# Patient Record
Sex: FEMALE | Race: BLACK OR AFRICAN AMERICAN
[De-identification: names, ages, dates, MRNs, and addresses within clinical notes are randomized per-mention and may not be internally consistent; named-entity substitution may affect disease eponyms.]

---

## 2017-03-10 NOTE — ER DOCUMENT REPORT
ED General





- General


Chief Complaint: Chest Congestion


Stated Complaint: CHEST DISCOMFORT


Mode of Arrival: Wheelchair


Information source: Patient


Notes: 


26-year-old female presents with multiple complaints.  Patient notes that she 

is having some difficulty hearing complaining of gastric reflux complaining of 

left shoulder pain left anterior chest wall pain.  Patient notes she fell about 

2 weeks ago and since then she's been feeling achy.  Patient has had gastric 

reflux her life.  Denies any fevers or chills nausea.  Patient admits to 

intermittent decreased appetite





- HPI


Onset: Other


Onset/Duration: Persistent


Quality of pain: Achy


Severity: Mild


Pain Level: 1


Associated symptoms: Body/muscle aches, Earache, Nausea, Vomiting


Exacerbated by: Movement


Relieved by: Denies


Similar symptoms previously: No


Recently seen / treated by doctor: No





Past Medical History





- General


Information source: Patient, Relative - 





- Social History


Smoking Status: Never Smoker


Cigarette use (# per day): No


Chew tobacco use (# tins/day): No


Smoking Education Provided: No


Family History: Reviewed & Not Pertinent





Review of Systems





- Review of Systems


Notes: 


PHYSICAL EXAMINATION:





GENERAL: Well-appearing, well-nourished and in no acute distress.





HEAD: Atraumatic, normocephalic.





EYES: Pupils equal round and reactive to light, extraocular movements intact, 

conjunctiva are normal.





ENT: Nares patent, oropharynx clear without exudates.  Moist mucous membranes.





NECK: Normal range of motion, supple without lymphadenopathy





LUNGS: Breath sounds clear to auscultation bilaterally and equal.  No wheezes 

rales or rhonchi.





HEART: Regular rate and rhythm without murmurs reproducible chest wall 

tenderness





ABDOMEN: Soft, nontender, nondistended abdomen.  No guarding, no rebound.  No 

masses appreciated.





Female : deferred





Musculoskeletal: Normal range of motion, no pitting or edema.  No cyanosis.





NEUROLOGICAL: Cranial nerves grossly intact.  Normal speech, normal gait.  

Normal sensory, motor exams





PSYCH: Normal mood, normal affect.





SKIN: Warm, Dry, normal turgor, no rashes or lesions noted.





Physical Exam





- Vital signs


Vitals: 





 











Temp Pulse Resp BP Pulse Ox


 


 98.5 F   78   16   117/71   99 


 


 03/10/17 16:47  03/10/17 16:47  03/10/17 16:47  03/10/17 16:47  03/10/17 16:47














Course





- Re-evaluation


Re-evalutation: 





03/10/17 19:57


Physical examination notes no significant abnormality lab work x-ray was 

negative.  Patient does not have cardiac related chest pain.











Patient will be treated for her gastric reflux and otherwise stable for 

discharge











After performing a Medical Screening Examination, I estimate there is LOW risk 

for RUPTURED ESOPHAGUS, PNEUMOTHORAX, PULMONARY EMBOLISM, ACUTE CORONARY 

SYNDROME, OR THORACIC AORTIC DISSECTION, thus I consider the discharge 

disposition reasonable. The patient and I have discussed the diagnosis and risks

, and we agree with discharging home with close follow-up. We also discussed 

returning to the Emergency Department immediately if new or worsening symptoms 

occur. We have discussed the symptoms which are most concerning (e.g., bloody 

sputum, worsening pain or shortness of breath) that necessitate immediate 

return.





- Vital Signs


Vital signs: 





 











Temp Pulse Resp BP Pulse Ox


 


 98.5 F   78   16   117/71   99 


 


 03/10/17 16:47  03/10/17 16:47  03/10/17 16:47  03/10/17 16:47  03/10/17 16:47














- Laboratory


Result Diagrams: 


 03/10/17 18:35





 03/10/17 18:35


Laboratory results interpreted by me: 





 











  03/10/17 03/10/17 03/10/17





  18:35 18:35 18:35


 


RDW  14.4 H  


 


Eosinophils %  8.0 H  


 


Absolute Eosinophils  0.7 H  


 


Total Protein   10.6 H 


 


Urine Ketones    TRACE H


 


Ur Leukocyte Esterase    MODERATE H














- Diagnostic Test


Radiology reviewed: Image reviewed, Reports reviewed





- EKG Interpretation by Me


EKG shows normal: Sinus rhythm, Axis, Intervals, QRS Complexes





Discharge





- Discharge


Clinical Impression: 


 Chest wall pain, Gastric reflux





Ear pressure


Qualifiers:


 Laterality: bilateral Qualified Code(s): H93.8X3 - Other specified disorders 

of ear, bilateral





Condition: Stable


Disposition: HOME, SELF-CARE


Instructions:  Chest Wall Pain ([facility])


Prescriptions: 


Famotidine [Pepcid 20 mg Tablet] 20 mg PO DAILY #60 tablet


Naproxen 500 mg PO BID #30 tablet


Referrals: 


TINY WASHINGTON MD [ACTIVE STAFF] - Follow up tomorrow

## 2017-03-10 NOTE — ER DOCUMENT REPORT
ED Medical Screen (RME)





- General


Stated Complaint: CHEST DISCOMFORT


Mode of Arrival: Wheelchair


Information source: Patient, Relative - 


Notes: 


Patient presents to the emergency department with complaints of chest heaviness 

and left arm pain.  Patient reports she fell down steps 2 weeks ago and had 

bruising to her chest.  She was evaluated at a hospital and discharged home.  

She reports now her chest feels heavy.  She reports she has a headache very 

weak no appetite.  She reports a lot of heartburn.  Denies any past medical 

history.








I have greeted and performed a rapid initial assessment of this patient.  A 

comprehensive ED assessment and evaluation of the patient, analysis of test 

results and completion of the medical decision making process will be conducted 

by additional ED providers.





Physical Exam





- Vital signs


Vitals: 





 











Temp Pulse Resp BP Pulse Ox


 


 98.5 F   78   16   117/71   99 


 


 03/10/17 16:47  03/10/17 16:47  03/10/17 16:47  03/10/17 16:47  03/10/17 16:47














Course





- Vital Signs


Vital signs: 





 











Temp Pulse Resp BP Pulse Ox


 


 98.5 F   78   16   117/71   99 


 


 03/10/17 16:47  03/10/17 16:47  03/10/17 16:47  03/10/17 16:47  03/10/17 16:47

## 2019-11-18 ENCOUNTER — HOSPITAL ENCOUNTER (EMERGENCY)
Dept: HOSPITAL 62 - ER | Age: 29
LOS: 1 days | Discharge: HOME | End: 2019-11-19
Payer: SELF-PAY

## 2019-11-18 DIAGNOSIS — M87.851: Primary | ICD-10-CM

## 2019-11-18 DIAGNOSIS — R26.2: ICD-10-CM

## 2019-11-18 DIAGNOSIS — L20.9: ICD-10-CM

## 2019-11-18 DIAGNOSIS — M87.852: ICD-10-CM

## 2019-11-18 DIAGNOSIS — M79.604: ICD-10-CM

## 2019-11-18 DIAGNOSIS — F17.200: ICD-10-CM

## 2019-11-18 PROCEDURE — 36415 COLL VENOUS BLD VENIPUNCTURE: CPT

## 2019-11-18 PROCEDURE — 85045 AUTOMATED RETICULOCYTE COUNT: CPT

## 2019-11-18 PROCEDURE — 96374 THER/PROPH/DIAG INJ IV PUSH: CPT

## 2019-11-18 PROCEDURE — 99284 EMERGENCY DEPT VISIT MOD MDM: CPT

## 2019-11-18 PROCEDURE — 96361 HYDRATE IV INFUSION ADD-ON: CPT

## 2019-11-18 PROCEDURE — 80053 COMPREHEN METABOLIC PANEL: CPT

## 2019-11-18 PROCEDURE — 73564 X-RAY EXAM KNEE 4 OR MORE: CPT

## 2019-11-18 PROCEDURE — 96375 TX/PRO/DX INJ NEW DRUG ADDON: CPT

## 2019-11-18 PROCEDURE — 71045 X-RAY EXAM CHEST 1 VIEW: CPT

## 2019-11-18 PROCEDURE — 85025 COMPLETE CBC W/AUTO DIFF WBC: CPT

## 2019-11-18 PROCEDURE — 84703 CHORIONIC GONADOTROPIN ASSAY: CPT

## 2019-11-18 PROCEDURE — 73610 X-RAY EXAM OF ANKLE: CPT

## 2019-11-18 PROCEDURE — 73502 X-RAY EXAM HIP UNI 2-3 VIEWS: CPT

## 2019-11-18 PROCEDURE — 93971 EXTREMITY STUDY: CPT

## 2019-11-18 NOTE — RADIOLOGY REPORT (SQ)
EXAM DESCRIPTION: 



US EXTREMITY VEINS RIGHT



COMPLETED DATE/TME:  11/18/2019 18:49



CLINICAL HISTORY: 



29 years, Female, RLE pain



COMPARISON:

None.



TECHNIQUE:





LIMITATIONS:

None.



FINDINGS:



There are some prominent lymph nodes in the right groin.



The common femoral, femoral, popliteal, posterior tibial and

peroneal veins are patent and compressible.



Venous Doppler waveforms are unremarkable.



IMPRESSION:



Prominent lymph nodes in the right groin.



No evidence of deep venous thrombosis. 

 



copyright 2011 [employer]- All Rights Reserved

## 2019-11-18 NOTE — ER DOCUMENT REPORT
ED General





- General


Chief Complaint: Leg Pain


Stated Complaint: LEG PAIN


Time Seen by Provider: 11/18/19 18:40


Primary Care Provider: 


MIGUEL BONDS MD [PEDIATRICS] - Follow up as needed





- HPI


Notes: 





Patient is a 29-year-old female who presents emergency department for evaluation

 of right leg pain.  She states is been ongoing for about 3 weeks.  She states 

is progressive.  She is actually to the point where she can barely walk.  She 

denies any known injury.  She states she started to have pain in her left leg 

because she is been compensating so much for the pain in her right.  She denies 

any chest pain or shortness of breath.  She states she really cannot describe 

the pain other than its excruciating.  She does relate another type of pain in 

her right shin, that she described as a burning and scraping tight sensation.  

It is worsened by movement, nothing seems to make it better.





- Related Data


Allergies/Adverse Reactions: 


                                        





No Known Allergies Allergy (Unverified 03/10/17 19:53)


   








Home Medications: Dupilumab weekly





Past Medical History





- General


Information source: Patient





- Social History


Smoking Status: Current Every Day Smoker


Frequency of alcohol use: Occasional


Drug Abuse: None


Family History: Reviewed & Not Pertinent


Patient has suicidal ideation: No


Patient has homicidal ideation: No


Skin Medical History: Reports Other - Atopic dermatitis





- Immunizations


Hx Diphtheria, Pertussis, Tetanus Vaccination: Yes





Review of Systems





- Review of Systems


Constitutional: No symptoms reported


EENT: No symptoms reported


Cardiovascular: No symptoms reported


Respiratory: No symptoms reported


Gastrointestinal: No symptoms reported


Genitourinary: No symptoms reported


Musculoskeletal: See HPI


Skin: No symptoms reported


Neurological/Psychological: No symptoms reported





Physical Exam





- Vital signs


Vitals: 


                                        











Temp Pulse BP Pulse Ox


 


 98.7 F   76   123/88 H  100 


 


 11/18/19 18:21  11/18/19 18:21  11/18/19 18:21  11/18/19 18:21














- Notes


Notes: 





Vital signs reviewed, please refer to chart. Head is normocephalic, atraumatic. 

 Pupils equal round, reactive to light.  Neck is supple without meningismus.  

Heart is regular rate and rhythm.  Lungs are clear to auscultation bilaterally. 

 Abdomen is soft, nontender, normoactive bowel sounds throughout.  Extremities 

without cyanosis, clubbing.  Examination of the right lower extremity yields no 

obvious deformity.  She has marked tenderness palpation over the greater 

trochanter.  Passive range of motion of the hip elicits some pain.  She has 

tenderness to palpation over the anterior thigh and lateral knee as well.  Full 

range of motion of the knee, ankle, toes.  Neurovascularly intact distally.  

There is no apparent tenderness on palpation of the left lower extremity, or 

pain elicited with range of motion.





Course





- Re-evaluation


Re-evalutation: 





11/19/19 01:18


Patient presents emergency department for evaluation of right leg pain.  She had

 imaging is ordered through triage.  Imaging revealed findings concerning for 

avascular necrosis of the hips.  Patient is unable to tell me anything about her

 family history, she is from [place].  I was concerned about the possibility of 

sickle cell anemia being the etiology of this.  Blood work was ordered, in 

addition to IV fluids and pain medication.  Blood work failed to reveal any 

signs of sickle cell.  On further questioning the patient admits to extensive 

prednisone therapy in the past for her atopic dermatitis.  This is likely the 

cause of her AVN.  I will refer her on to Ortho.  She will be sent home with 

prescription strength anti-inflammatories and pain medication.  She is to return

 to the ED with worsening.





- Vital Signs


Vital signs: 


                                        











Temp Pulse Resp BP Pulse Ox


 


 98.7 F   76      123/88 H  100 


 


 11/18/19 18:21  11/18/19 18:21     11/18/19 18:21  11/18/19 18:21














- Laboratory


Result Diagrams: 


                                 11/18/19 23:45





                                 11/18/19 23:45


Laboratory results interpreted by me: 


                                        











  11/18/19 11/18/19





  23:45 23:45


 


RDW  14.2 H 


 


Eos % (Auto)  8.8 H 


 


Chloride   109 H


 


Total Protein   10.5 H














- Diagnostic Test


Radiology reviewed: Reports reviewed


Radiology results interpreted by me: 





11/19/19 01:19





                                        





Ankle X-Ray  11/18/19 18:49


IMPRESSION:  NO FRACTURE.


 








Hip/Pelvis X-Ray  11/18/19 18:49


IMPRESSION:  No fracture or dislocation.  Lytic-sclerotic areas in both femoral 

heads, possible AVN.


 








Knee X-Ray  11/18/19 18:49


IMPRESSION:  NO FRACTURE.


 








Venous Doppler Study  11/18/19 18:49


IMPRESSION:


 


Prominent lymph nodes in the right groin.


 


No evidence of deep venous thrombosis. 


 


 


copyright 2011 [employer]- All Rights Reserved


 








Chest X-Ray  11/18/19 18:50


IMPRESSION:  Slightly increased interstitial- alveolar opacities, nonspecific.  

No consolidation or pleural effusion.


 














Discharge





- Discharge


Clinical Impression: 


 Avascular necrosis of right femoral head, Right leg pain, Avascular necrosis of

 left femoral head





Condition: Stable


Disposition: HOME, SELF-CARE


Instructions:  Leg Pain Nonspecific ([facility])


Additional Instructions: 


Findings on your x-ray were concerning for a condition called avascular necrosis

 of both of your hips.  This needs to be followed up.  You have been referred on

 to orthopedic surgery, as well as to [facility] for [facility] 

[facility].  Take medications as directed for pain.  Watch for dizziness, drowsiness, 

constipation with the pain medication.  Return to the emergency department if 

you develop worsening or new concerning symptoms of any sort.


Prescriptions: 


Naproxen [Naprosyn] 500 mg PO BID #20 tablet


Forms:  Return to School


Referrals: 


POPPY CARMICHAEL DO [ACTIVE STAFF] - Follow up as needed


[facility] [NO LOCAL MD] - Follow up as needed

## 2019-11-18 NOTE — RADIOLOGY REPORT (SQ)
EXAM DESCRIPTION:  CHEST SINGLE VIEW



COMPLETED DATE/TIME:  11/18/2019 7:19 pm



REASON FOR STUDY:  shortness of breath



COMPARISON:  None.



TECHNIQUE:  Single frontal radiographic view of the chest acquired.



NUMBER OF VIEWS:  One view.



LIMITATIONS:  None.



FINDINGS:  LUNGS AND PLEURA: No pneumothorax.  Slightly increased interstitial- alveolar opacities.  
No consolidation or pleural effusion.

MEDIASTINUM AND HILAR STRUCTURES: Stable.

HEART AND VASCULAR STRUCTURES: Stable.

BONES: No acute findings.

HARDWARE: None in the chest.

OTHER: No other significant finding.



IMPRESSION:  Slightly increased interstitial- alveolar opacities, nonspecific.  No consolidation or p
leural effusion.



TECHNICAL DOCUMENTATION:  JOB ID:  [id number]

[id number]

 2011 [email or web address]- All Rights Reserved



Reading location - IP/workstation name: [email or web address]

## 2019-11-18 NOTE — ER DOCUMENT REPORT
ED Medical Screen (RME)





- General


Chief Complaint: Leg Pain


Stated Complaint: LEG PAIN


Time Seen by Provider: 11/18/19 18:40


Primary Care Provider: 


MIGUEL BONDS MD [Primary Care Provider] - Follow up as needed


Notes: 





Patient is a 29-year-old female who presents the emergency department with a 

chief complaint of right leg pain.  She has had her symptoms for the past 3 

weeks.  She is now to the point to where she needs help to stand.  Patient 

states the pain is in her right hip, right knee, and right ankle.  Patient was 

seen by urgent care was referred here to the emergency department.  Patient also

has shortness of breath every once in a while.  Patient is a current everyday 

smoker.





Exam: Clear breath sounds.  Tenderness to right lower extremity.





I have greeted and performed a rapid initial assessment of this patient.  A 

comprehensive ED assessment and evaluation of the patient, analysis of test 

results and completion of medical decision making process will be conducted by 

an additional ED providers.





- Related Data


Allergies/Adverse Reactions: 


                                        





No Known Allergies Allergy (Unverified 03/10/17 19:53)


   











Past Medical History





- Social History


Frequency of alcohol use: Occasional


Drug Abuse: None





- Immunizations


Hx Diphtheria, Pertussis, Tetanus Vaccination: Yes





Physical Exam





- Vital signs


Vitals: 





                                        











Temp Pulse BP Pulse Ox


 


 98.7 F   76   123/88 H  100 


 


 11/18/19 18:21  11/18/19 18:21  11/18/19 18:21  11/18/19 18:21














Course





- Vital Signs


Vital signs: 





                                        











Temp Pulse Resp BP Pulse Ox


 


 98.7 F   76      123/88 H  100 


 


 11/18/19 18:21  11/18/19 18:21     11/18/19 18:21  11/18/19 18:21














Doctor's Discharge





- Discharge


Referrals: 


MIGUEL BONDS MD [Primary Care Provider] - Follow up as needed

## 2019-11-18 NOTE — RADIOLOGY REPORT (SQ)
EXAM DESCRIPTION:  KNEE RIGHT 4 VIEWS



COMPLETED DATE/TIME:  11/18/2019 7:19 pm



REASON FOR STUDY:  RLE pain



COMPARISON:  None.



EXAM PARAMETERS:  NUMBER OF VIEWS: Four views.

TECHNIQUE: AP, lateral and oblique  radiographic images acquired of the right knee.

LIMITATIONS: None.



FINDINGS:  MINERALIZATION: Normal.

BONES: No acute fracture or dislocation.  No worrisome bone lesions.

JOINTS: No effusion.

SOFT TISSUES: No significant soft tissue swelling.  No radiopaque foreign body.

OTHER: No other significant finding.



IMPRESSION:  NO FRACTURE.



TECHNICAL DOCUMENTATION:  JOB ID:  [id number]

[id number]

 2011 [employer]- All Rights Reserved



Reading location - IP/workstation name: [email or web address] ([email or web address])

## 2019-11-18 NOTE — RADIOLOGY REPORT (SQ)
EXAM DESCRIPTION:  ANKLE RIGHT COMPLETE



COMPLETED DATE/TIME:  11/18/2019 7:19 pm



REASON FOR STUDY:  RLE pain



COMPARISON:  None.



EXAM PARAMETERS:  NUMBER OF VIEWS: Three views.

TECHNIQUE: AP, lateral and oblique  radiographic images acquired of the right ankle.

LIMITATIONS: None.



FINDINGS:  MINERALIZATION: Normal.

BONES: No acute fracture or dislocation.  No worrisome bone lesions.

JOINTS: No effusion.

SOFT TISSUES: No significant soft tissue swelling.  No radiopaque foreign body.

OTHER: No other significant finding.



IMPRESSION:  NO FRACTURE.



TECHNICAL DOCUMENTATION:  JOB ID:  [id number]

[id number]

 2011 [employer]- All Rights Reserved



Reading location - IP/workstation name: [email or web address]

## 2019-11-18 NOTE — RADIOLOGY REPORT (SQ)
EXAM DESCRIPTION:  HIP RIGHT AP/LATERAL



COMPLETED DATE/TIME:  11/18/2019 7:19 pm



REASON FOR STUDY:  RLE pain



COMPARISON:  None.



NUMBER OF VIEWS:  Two views.



TECHNIQUE:  AP pelvis and additional frog-leg view of the right hip.



LIMITATIONS:  None.



FINDINGS:  MINERALIZATION: Normal.

RIGHT HIP: No fracture or dislocation.  Lytic-sclerotic areas in the femoral head, possible AVN.

LEFT HIP: No fracture or dislocation.  Lytic-sclerotic areas in the femoral head, possible AVN..

PUBIS AND ISCHIUM: No fracture.

PELVIS: No fracture.

SACRUM: No fracture or dislocation. No worrisome bone lesions.

LOWER LUMBAR SPINE: No fracture or dislocation. No worrisome bone lesions.  No significant disc disea
se.

SOFT TISSUES: No findings.

OTHER: No other significant finding.



IMPRESSION:  No fracture or dislocation.  Lytic-sclerotic areas in both femoral heads, possible AVN.



TECHNICAL DOCUMENTATION:  JOB ID:  [id number]

[id number]

 2011 [employer]- All Rights Reserved



Reading location - IP/workstation name: [email or web address]

## 2019-11-19 VITALS — SYSTOLIC BLOOD PRESSURE: 120 MMHG | DIASTOLIC BLOOD PRESSURE: 81 MMHG

## 2019-11-19 LAB
ABSOLUTE RETICS #: 0.05 10^6/UL (ref 0.03–0.12)
ADD MANUAL DIFF: NO
ALBUMIN SERPL-MCNC: 3.9 G/DL (ref 3.5–5)
ALP SERPL-CCNC: 76 U/L (ref 38–126)
ANION GAP SERPL CALC-SCNC: 9 MMOL/L (ref 5–19)
AST SERPL-CCNC: 31 U/L (ref 14–36)
BASOPHILS # BLD AUTO: 0 10^3/UL (ref 0–0.2)
BASOPHILS NFR BLD AUTO: 0.4 % (ref 0–2)
BILIRUB DIRECT SERPL-MCNC: 0.1 MG/DL (ref 0–0.4)
BILIRUB SERPL-MCNC: 0.4 MG/DL (ref 0.2–1.3)
BUN SERPL-MCNC: 8 MG/DL (ref 7–20)
CALCIUM: 9.5 MG/DL (ref 8.4–10.2)
CHLORIDE SERPL-SCNC: 109 MMOL/L (ref 98–107)
CO2 SERPL-SCNC: 22 MMOL/L (ref 22–30)
EOSINOPHIL # BLD AUTO: 0.6 10^3/UL (ref 0–0.6)
EOSINOPHIL NFR BLD AUTO: 8.8 % (ref 0–6)
ERYTHROCYTE [DISTWIDTH] IN BLOOD BY AUTOMATED COUNT: 14.2 % (ref 11.5–14)
GLUCOSE SERPL-MCNC: 77 MG/DL (ref 75–110)
HCT VFR BLD CALC: 36.7 % (ref 36–47)
HGB BLD-MCNC: 12.4 G/DL (ref 12–15.5)
LYMPHOCYTES # BLD AUTO: 2.1 10^3/UL (ref 0.5–4.7)
LYMPHOCYTES NFR BLD AUTO: 31.3 % (ref 13–45)
MCH RBC QN AUTO: 28.5 PG (ref 27–33.4)
MCHC RBC AUTO-ENTMCNC: 33.8 G/DL (ref 32–36)
MCV RBC AUTO: 84 FL (ref 80–97)
MONOCYTES # BLD AUTO: 0.7 10^3/UL (ref 0.1–1.4)
MONOCYTES NFR BLD AUTO: 11.3 % (ref 3–13)
NEUTROPHILS # BLD AUTO: 3.2 10^3/UL (ref 1.7–8.2)
NEUTS SEG NFR BLD AUTO: 48.2 % (ref 42–78)
PLATELET # BLD: 304 10^3/UL (ref 150–450)
POTASSIUM SERPL-SCNC: 4 MMOL/L (ref 3.6–5)
PROT SERPL-MCNC: 10.5 G/DL (ref 6.3–8.2)
RBC # BLD AUTO: 4.34 10^6/UL (ref 3.72–5.28)
RETICULOCYTE COUNT (AUTO): 1.08 % (ref 0.66–2.85)
TOTAL CELLS COUNTED % (AUTO): 100 %
WBC # BLD AUTO: 6.6 10^3/UL (ref 4–10.5)

## 2020-08-31 ENCOUNTER — HOSPITAL ENCOUNTER (EMERGENCY)
Dept: HOSPITAL 62 - ER | Age: 30
LOS: 1 days | Discharge: LEFT BEFORE DISCHARGE | End: 2020-09-01
Payer: SELF-PAY

## 2020-08-31 VITALS — SYSTOLIC BLOOD PRESSURE: 117 MMHG | DIASTOLIC BLOOD PRESSURE: 82 MMHG

## 2020-08-31 DIAGNOSIS — H53.8: ICD-10-CM

## 2020-08-31 DIAGNOSIS — R51: ICD-10-CM

## 2020-08-31 DIAGNOSIS — R42: ICD-10-CM

## 2020-08-31 DIAGNOSIS — R07.9: ICD-10-CM

## 2020-08-31 DIAGNOSIS — Z53.21: Primary | ICD-10-CM

## 2020-08-31 LAB
ABSOLUTE LYMPHOCYTES# (MANUAL): 2.7 10^3/UL (ref 0.5–4.7)
ABSOLUTE MONOCYTES # (MANUAL): 0.1 10^3/UL (ref 0.1–1.4)
ADD MANUAL DIFF: YES
ALBUMIN SERPL-MCNC: 3.8 G/DL (ref 3.5–5)
ALP SERPL-CCNC: 72 U/L (ref 38–126)
ANION GAP SERPL CALC-SCNC: 7 MMOL/L (ref 5–19)
ANISOCYTOSIS BLD QL SMEAR: SLIGHT
APPEARANCE UR: CLEAR
APTT PPP: (no result) S
AST SERPL-CCNC: 36 U/L (ref 14–36)
BASOPHILS NFR BLD MANUAL: 0 % (ref 0–2)
BILIRUB DIRECT SERPL-MCNC: 0.3 MG/DL (ref 0–0.4)
BILIRUB SERPL-MCNC: 0.5 MG/DL (ref 0.2–1.3)
BILIRUB UR QL STRIP: NEGATIVE
BUN SERPL-MCNC: 6 MG/DL (ref 7–20)
CALCIUM: 9.1 MG/DL (ref 8.4–10.2)
CHLORIDE SERPL-SCNC: 106 MMOL/L (ref 98–107)
CO2 SERPL-SCNC: 23 MMOL/L (ref 22–30)
EOSINOPHIL NFR BLD MANUAL: 2 % (ref 0–6)
ERYTHROCYTE [DISTWIDTH] IN BLOOD BY AUTOMATED COUNT: 15.3 % (ref 11.5–14)
GLUCOSE SERPL-MCNC: 88 MG/DL (ref 75–110)
GLUCOSE UR STRIP-MCNC: NEGATIVE MG/DL
HCT VFR BLD CALC: 37.1 % (ref 36–47)
HGB BLD-MCNC: 12.8 G/DL (ref 12–15.5)
KETONES UR STRIP-MCNC: NEGATIVE MG/DL
MCH RBC QN AUTO: 29.6 PG (ref 27–33.4)
MCHC RBC AUTO-ENTMCNC: 34.4 G/DL (ref 32–36)
MCV RBC AUTO: 86 FL (ref 80–97)
MONOCYTES % (MANUAL): 1 % (ref 3–13)
NITRITE UR QL STRIP: NEGATIVE
OVALOCYTES BLD QL SMEAR: SLIGHT
PH UR STRIP: 7 [PH] (ref 5–9)
PLATELET # BLD: 278 10^3/UL (ref 150–450)
PLATELET COMMENT: ADEQUATE
POIKILOCYTOSIS BLD QL SMEAR: SLIGHT
POTASSIUM SERPL-SCNC: 4.2 MMOL/L (ref 3.6–5)
PROT SERPL-MCNC: 11.5 G/DL (ref 6.3–8.2)
PROT UR STRIP-MCNC: NEGATIVE MG/DL
RBC # BLD AUTO: 4.32 10^6/UL (ref 3.72–5.28)
ROULEAUX BLD QL SMEAR: (no result)
SEGMENTED NEUTROPHILS % (MAN): 50 % (ref 42–78)
SP GR UR STRIP: 1.01
TOTAL CELLS COUNTED BLD: 100
TOXIC GRANULES BLD QL SMEAR: (no result)
UROBILINOGEN UR-MCNC: NEGATIVE MG/DL (ref ?–2)
VARIANT LYMPHS NFR BLD MANUAL: 47 % (ref 13–45)
WBC # BLD AUTO: 5.7 10^3/UL (ref 4–10.5)

## 2020-08-31 PROCEDURE — 84484 ASSAY OF TROPONIN QUANT: CPT

## 2020-08-31 PROCEDURE — 36415 COLL VENOUS BLD VENIPUNCTURE: CPT

## 2020-08-31 PROCEDURE — 99281 EMR DPT VST MAYX REQ PHY/QHP: CPT

## 2020-08-31 PROCEDURE — 93010 ELECTROCARDIOGRAM REPORT: CPT

## 2020-08-31 PROCEDURE — 81001 URINALYSIS AUTO W/SCOPE: CPT

## 2020-08-31 PROCEDURE — 84703 CHORIONIC GONADOTROPIN ASSAY: CPT

## 2020-08-31 PROCEDURE — 71045 X-RAY EXAM CHEST 1 VIEW: CPT

## 2020-08-31 PROCEDURE — 83735 ASSAY OF MAGNESIUM: CPT

## 2020-08-31 PROCEDURE — 80053 COMPREHEN METABOLIC PANEL: CPT

## 2020-08-31 PROCEDURE — 85025 COMPLETE CBC W/AUTO DIFF WBC: CPT

## 2020-08-31 PROCEDURE — 93005 ELECTROCARDIOGRAM TRACING: CPT

## 2020-08-31 PROCEDURE — 70450 CT HEAD/BRAIN W/O DYE: CPT

## 2020-08-31 NOTE — RADIOLOGY REPORT (SQ)
CT HEAD WITHOUT IV CONTRAST

XR CHEST 1 VIEW



HISTORY: Headache and blurry vision.



COMPARISON: None.



TECHNIQUE: 



1.  CT scan of the brain was performed without  IV contrast. 

2.  Single AP view of the chest was submitted for interpretation.



This exam was performed according to our departmental

dose-optimization program, which includes automated exposure

control, adjustment of the mA and/or kV according to patient size

and/or use of iterative reconstruction technique.



FINDINGS:



The ventricles, cisterns, and sulci are age-appropriate. No

evidence of acute infarction, intracranial hemorrhage,

extra-axial fluid collection, or midline shift. No air-fluid

levels are seen in the paranasal sinuses to suggest acute

sinusitis. There is opacification of the bilateral mastoid air

cells. No depressed skull fracture.



The heart size is within normal limits. There is no pulmonary

vascular congestion. No consolidation, pleural effusion, or

pneumothorax is seen. The bony structures are preserved.



IMPRESSION: 



1.  No acute intracranial findings.

2.  Bilateral mastoiditis, which may be chronic.

3.  No evidence of acute cardiopulmonary disease.

## 2020-08-31 NOTE — ER DOCUMENT REPORT
ED Medical Screen (RME)





- General


Chief Complaint: Ear Pain


Stated Complaint: EAR PAIN,DIZZINESS


Time Seen by Provider: 08/31/20 19:07


Information source: Patient


Notes: 





Patient presents complaining of chest pain headache with dizziness.  Patient 

states that she is had some blurred vision.  Patient also complains of pressure 

in her ears.  Patient states that she has chronic decreased hearing but her 

hearing seems to be worse today.  Patient denies any nausea or vomiting.





I have greeted and performed a rapid initial assessment of this patient.  A 

comprehensive ED assessment and evaluation of the patient, analysis of test 

results and completion of the medical decision making process will be conducted 

by additional ED providers.





- Related Data


Allergies/Adverse Reactions: 


                                        





No Known Allergies Allergy (Verified 08/31/20 19:06)


   











Past Medical History





- Social History


Frequency of alcohol use: Occasional


Drug Abuse: Marijuana





- Immunizations


Hx Diphtheria, Pertussis, Tetanus Vaccination: Yes





Physical Exam





- Vital signs


Vitals: 





                                        











Temp Pulse Resp BP Pulse Ox


 


 99.7 F   100   18   107/68   100 


 


 08/31/20 18:12  08/31/20 18:12  08/31/20 18:12  08/31/20 18:12  08/31/20 18:12














- Respiratory


Respiratory status: No respiratory distress


Chest status: Tender


Breath sounds: Normal





- Cardiovascular


Rhythm: Regular


Heart sounds: S1 appreciated, S2 appreciated


Murmur: No





Course





- Vital Signs


Vital signs: 





                                        











Temp Pulse Resp BP Pulse Ox


 


 99.7 F   100   18   107/68   100 


 


 08/31/20 18:12  08/31/20 18:12  08/31/20 18:12  08/31/20 18:12  08/31/20 18:12

## 2025-07-17 NOTE — EKG REPORT
Name: Adrienne Theodore      : 1961      MRN: [id number]  Encounter Provider: Tray Kang DO  Encounter Date: 2025   Encounter department: [facility] PALLIATIVE [facility]  :  Assessment & Plan  Endometrial cancer (HCC)  [facility]ra[employer]  used for this visit today - (Name: Ruby, ID# [id number])  Patient is accompanied by her caretaker as well today.  Following Gyn/Onc  Treatment plan with systemic chemo.  She is tolerating this at this time.  However, endorsing chronic pain to lower extremities bilaterally (which appear to be chronic as she states- have been going on for months).    She also endorsed bitterness to her mouth, changes in taste likely associated with dysgeusia and dry mouth for which she endorses.    We discussed options for dry mouth management including Biotene mouth rinse for which I printed a picture for patient's reference when she goes to the store. Discussed trying Biotene mouth rinse before meals to see if this helps with dry mouth as well as dysgeusia.    She also endorses a headache after her recent treatment for which she is requesting tylenol. Script for Tylenol sent, but discussed that this may need to be obtained OTC if insurance does not cover.  Reviewed maximum alloted amount not to exceed 3,000mg in a 24 hour period with Tylenol.  Patient states understanding and agreement.    She also has some soreness to her port since placement but on exam, does not appear to be agitated, no erythema or purulence or skin wound or signs consistent with an infection. Patient is afebrile, normotensive, and in no acute distress on exam.    Orders:    acetaminophen (TYLENOL) 500 mg tablet; Take 1 tablet (500 mg total) by mouth every 6 (six) hours as needed for mild pain    Cancer related pain  Patient has 11 days left of OxyIR  Typically using 2 doses a day of OxyIR 5mg  Patient to call 2-3 days prior to running out so that a refill can be provided to avoid any lapse in  SEVERITY:- NORMAL ECG -

SINUS RHYTHM

:

Confirmed by: Juan Carlos Zuniga MD 31-Aug-2020 21:00:00 medication supply.    Can continue current plan:  1) OxyIR 5mg q6 hours PRN  2) Bowel regimen to avoid OIC    Patient does endorse chronic bilateral knee pain L>R. Patient has a history of DVT for which she is on anticoagulation with Eliquis for which she remains compliant with.  She endorses pain to the lateral aspect of the left knee particularly at the left knee joint space on palpation. She endorses standing can be painful as well.  I do not see any erythema, warmth, or pitting edema to the bilateral lower extremities today.    I did review Dr. Ho's encounter on 7/1/2025 in which patient was advised to get a repeat Duplex of the lower extremities to rule out worsening of her existing DVT.  I did speak with Gyn/Onc  who assisted in getting patient coordinated for scheduling ASAP with the Duplex already on order.    I did provide ER precautions to patient in which she states understanding.     ER Precautions  Watch for red flag symptoms including, but not limited to fevers, chills, chest pain, shortness of breath, intractable nausea/vomiting/diarrhea, or acute intractable pain (especially if pain is new or has changed).      Medication safety issues - Do not drive under the influence of narcotics (including opioids), watch for adverse effects including confusion / altered mental status / respiratory depression (slowed breathing), keep medications stored in a safe/locked environment, do not use alcohol while opioids or other narcotics are in your system. Do not travel with more than the minimum number of tablets or capsules required for the trip.    Orders:    acetaminophen (TYLENOL) 500 mg tablet; Take 1 tablet (500 mg total) by mouth every 6 (six) hours as needed for mild pain    Palliative care encounter  Psychosocial   Supportive listening provided  Normalized experience of patient/family  Provided anxiety containment     Referrals Placed / Medical Equipment Ordered  -None  -will have  follow-up Duplex as ordered by Gyn/Onc    Follow-Up Recommendations  -Follow-up with PCP and current medical specialists  -Follow-up with palliative care: 4-6 weeks    Orders:    acetaminophen (TYLENOL) 500 mg tablet; Take 1 tablet (500 mg total) by mouth every 6 (six) hours as needed for mild pain        Decisional apparatus: Patient is competent on my exam today. If competence is lost, patient's substitute decision maker would default to children by [place] Act 169.   Advance Directive / Living Will / POLST: None on file - will revisit at next follow-up     PDMP Review: I have reviewed the patient's controlled substance dispensing history in the Prescription Drug Monitoring Program in compliance with the [facility] regulations before prescribing any controlled substances.    History of Present Illness   Adrienne Theodore is a 63 y.o. female who presents transfer of care / follow-up with [facility].    Patient is seen today in office. Alert, oriented, pleasantly conversant.  Patient is seen in NAD.    Appetite has been fair.    Pain - to the bilateral knees with the L>R. No abdominal pain. Finds topical lidocaine to the knees to be helpful.    Patient is well supported by brother and children.      Medical History Reviewed by provider this encounter:  Tobacco  Allergies  Meds  Problems  Med Hx  Surg Hx  Fam Hx     .  Past Medical History   Past Medical History[1]  Past Surgical History[2]  Family History[3]   reports that she has never smoked. She has never used smokeless tobacco. She reports that she does not drink alcohol and does not use drugs.  Current Outpatient Medications   Medication Instructions    acetaminophen (TYLENOL) 500 mg, Oral, Every 6 hours PRN    apixaban (ELIQUIS) 5 mg, Oral, 2 times daily    Aspirin Low Dose 81 mg, Oral, Daily, Chew    atorvastatin (LIPITOR) 40 mg, Oral, Daily    Blood Glucose Monitoring Suppl (OneTouch Verio Reflect) w/Device KIT Check blood sugars three times daily. Please  substitute with appropriate alternative as covered by patient's insurance. Dx: E11.65    Blood Pressure Monitoring (B-D ASSURE BPM/DELUXE ARM CUFF) MISC Does not apply, Daily    famotidine (PEPCID) 20 mg, Oral, 2 times daily    furosemide (LASIX) 20 mg, Oral, Daily    gabapentin (NEURONTIN) 100 mg, Oral, Daily at bedtime    glucose blood (OneTouch Verio) test strip Check blood sugars three times daily. Please substitute with appropriate alternative as covered by patient's insurance. Dx: E11.65    Incontinence Supplies MISC Does not apply, Daily, Size medium    lidocaine (LMX) 4 % cream Topical, As needed    lidocaine-prilocaine (EMLA) cream Topical, As needed    LORazepam (ATIVAN) 1 mg, Oral, Every 8 hours PRN    losartan (COZAAR) 25 mg, Oral, Daily    metFORMIN (GLUCOPHAGE) 500 mg, Oral, Daily with breakfast    metoprolol succinate (TOPROL-XL) 25 mg, Oral, 2 times daily    naloxone (NARCAN) 4 mg/0.1 mL nasal spray Administer 1 spray into a nostril. If no response after 2-3 minutes, give another dose in the other nostril using a new spray.    ondansetron (ZOFRAN) 8 mg, Oral, Every 8 hours PRN    OneTouch Delica Lancets 33G MISC Check blood sugars three times daily. Please substitute with appropriate alternative as covered by patient's insurance. Dx: E11.65    oxyCODONE (ROXICODONE) 5 mg, Oral, Every 6 hours PRN    phenazopyridine (PYRIDIUM) 100 mg, Oral, 3 times daily PRN   Allergies[4]   Medications Ordered Prior to Encounter[5]   Social History[6]     Objective   /70 (BP Location: Left arm, Patient Position: Sitting, Cuff Size: Large)   Pulse 83   Temp 97.6 °F (36.4 °C) (Temporal)   Wt 76.2 kg (168 lb)   SpO2 96%   BMI 35.27 kg/m²     Physical Exam  Vitals reviewed.   Constitutional:       General: She is not in acute distress.     Appearance: She is not ill-appearing, toxic-appearing or diaphoretic.   HENT:      Head: Normocephalic and atraumatic.      Nose: Nose normal.      Mouth/Throat:      Mouth:  Mucous membranes are moist.     Eyes:      General:         Right eye: No discharge.         Left eye: No discharge.       Cardiovascular:      Rate and Rhythm: Normal rate.   Pulmonary:      Effort: Pulmonary effort is normal. No respiratory distress.   Abdominal:      General: Abdomen is flat. There is no distension.     Musculoskeletal:      Comments: Minimal swelling to the bilateral feet. Non-pitting bilaterally. Pain on palpation to the lateral aspect of the left knee and over left fibular head. No joint laxity on varus and valgus testing of the left knee. Minimal calf tenderness on palpation. No edema to the bilateral calves, no erythema, or open skin wounds. Notable varicosities to the lower parts of both legs and upper thighs. Dorsalis pedis pulses 2+ bilaterally.      Skin:     General: Skin is warm and dry.      Coloration: Skin is not jaundiced or pale.     Neurological:      General: No focal deficit present.      Mental Status: She is alert. Mental status is at baseline.     Psychiatric:         Mood and Affect: Mood normal.         Behavior: Behavior normal.         Thought Content: Thought content normal.         Judgment: Judgment normal.         Recent labs:  Lab Results   Component Value Date/Time    SODIUM 138 07/01/2025 02:58 PM    K 4.0 07/01/2025 02:58 PM    BUN 17 07/01/2025 02:58 PM    CREATININE 0.61 07/01/2025 02:58 PM    GLUC 78 07/01/2025 02:58 PM    CALCIUM 9.3 07/01/2025 02:58 PM    AST 17 07/01/2025 02:58 PM    ALT 18 07/01/2025 02:58 PM    ALB 4.4 07/01/2025 02:58 PM    TP 7.6 07/01/2025 02:58 PM    EGFR 96 07/01/2025 02:58 PM     Lab Results   Component Value Date/Time    HGB 12.1 07/14/2025 07:30 AM    WBC 6.61 07/14/2025 07:30 AM     07/14/2025 07:30 AM    INR 0.92 07/14/2025 07:30 AM    PTT 27 12/18/2024 09:22 AM     Lab Results   Component Value Date/Time    DGW1TUGCHWEP 1.151 07/01/2025 02:58 PM       Recent Imaging:  Procedure: IR PORT PLACEMENT  Result Date:  7/15/2025  Impression: Repositioning of right-sided power-injectable single-lumen tunneled chest port, with catheter tip in the expected location of the cavoatrial junction. Plan: The port may be used immediately. Workstation performed: [id number]     Procedure: NM PET CT skull base to mid thigh  Result Date: 6/25/2025  Impression: 1.  No findings suspicious for hypermetabolic malignancy. Improving collections in the pelvis with resolution of previously noted indistinct soft tissue thickening at the aortic bifurcation likely related to post operative changes. 2.  Mild uptake at the stable 1.8 cm left adrenal nodule, nonspecific. Given stability in size, this may represent a lipid poor adrenal adenoma. This could be confirmed with dedicated CT or MRI adrenal protocol or reassessed on follow-up imaging. Workstation performed: [id number]     Procedure: XR knee 3 vw left non injury  Result Date: 5/22/2025  Impression: No acute osseous abnormality. Computerized Assisted Algorithm (CAA) may have been used to analyze all applicable images. Workstation performed: [id number]     Procedure: XR knee 3 vw right non injury  Result Date: 5/22/2025  Impression: No acute osseous abnormality. Computerized Assisted Algorithm (CAA) may have been used to analyze all applicable images. Workstation performed: [id number]     Procedure: CTA chest pe study  Result Date: 5/1/2025  Impression: 1. No evidence of proximal pulmonary artery embolus. Distal order branches not diagnostically evaluated due to respiratory motion artifact. 2. No acute cardiopulmonary process. Workstation performed: [id number]       Administrative Statements   I have spent a total time of 31 minutes in caring for this patient on the day of the visit/encounter including Risks and benefits of tx options, Instructions for management, Patient and family education, Importance of tx compliance, Risk factor reductions, Impressions, Counseling / Coordination of care,  Documenting in the medical record, Reviewing/placing orders in the medical record (including tests, medications, and/or procedures), and Obtaining or reviewing history  .   Topics discussed with the patient / family include symptom assessment and management, medication review, medication adjustment, psychosocial support, supportive listening, and anticipatory guidance.         [1]   Past Medical History:  Diagnosis Date    Diabetes mellitus (HCC)     Fibroid     Heart failure (HCC)     Hyperlipidemia     Hypertension     Myocardial infarction (HCC) 06/05/2024    Nonischemic cardiomyopathy (HCC)    [2]   Past Surgical History:  Procedure Laterality Date    BACK SURGERY      BLADDER NECK SUSPENSION      CARDIAC CATHETERIZATION Left 07/10/2024    Procedure: Cardiac Left Heart Cath;  Surgeon: Mello Pedroza MD;  Location: [facility];  Service: Cardiology    FL GUIDED CENTRAL VENOUS ACCESS DEVICE INSERTION  3/13/2025    IR PORT PLACEMENT  7/14/2025    IL INSJ TUNNELED CTR VAD W/SUBQ PORT AGE 5 YR/> N/A 3/13/2025    Procedure: INSERTION VENOUS PORT ( PORT-A-CATH) IR;  Surgeon: Maurilio Harrington DO;  Location: [facility];  Service: Interventional Radiology    IL LAPS TOTAL HYSTERECT 250 GM/< W/RMVL TUBE/OVARY N/A 1/16/2025    Procedure: ROBOTIC ASSISTED TOTAL LAPAROSCOPIC HYSTERECTOMY, BILATERAL SALPINGO-OOPHERECTOMY, PELVIC AND PARA-AORTIC LYMPHADENECTOMY, OMENTAL BIOPSY, EXAM UNDER ANESTHESIA, CYSTOSCOPY;  Surgeon: Belkys Ho MD;  Location: [facility];  Service: Gynecology Oncology    SPINAL FIXATION SURGERY      L3-L5 fusion    VAGINA SURGERY     [3]   Family History  Problem Relation Name Age of Onset    Cancer Paternal Aunt          vaginal    Vaginal cancer Paternal Aunt      Cancer Paternal Grandmother          vaginal    Vaginal cancer Paternal Grandmother      Breast cancer Neg Hx      Colon cancer Neg Hx      Ovarian cancer Neg Hx      Endometrial cancer Neg Hx      Hypertension Mother       Diabetes Mother      Hypertension Father     [4] No Known Allergies  [5]   Current Outpatient Medications on File Prior to Visit   Medication Sig Dispense Refill    apixaban (Eliquis) 5 mg Take 1 tablet (5 mg total) by mouth 2 (two) times a day 60 tablet 2    Aspirin Low Dose 81 MG chewable tablet Chew 1 tablet (81 mg total) daily 30 tablet 0    atorvastatin (LIPITOR) 40 mg tablet Take 1 tablet (40 mg total) by mouth daily 90 tablet 2    Blood Glucose Monitoring Suppl (OneTouch Verio Reflect) w/Device KIT Check blood sugars three times daily. Please substitute with appropriate alternative as covered by patient's insurance. Dx: E11.65 1 kit 0    Blood Pressure Monitoring (B-D ASSURE BPM/DELUXE ARM CUFF) MISC Use in the morning 1 each 0    famotidine (PEPCID) 20 mg tablet Take 1 tablet (20 mg total) by mouth 2 (two) times a day 180 tablet 3    furosemide (LASIX) 20 mg tablet Take 1 tablet (20 mg total) by mouth daily 30 tablet 5    gabapentin (NEURONTIN) 100 mg capsule TAKE 1 CAPSULE (100 MG TOTAL) BY MOUTH DAILY AT BEDTIME 30 capsule 2    glucose blood (OneTouch Verio) test strip Check blood sugars three times daily. Please substitute with appropriate alternative as covered by patient's insurance. Dx: E11.65 300 each 3    Incontinence Supplies MISC Use in the morning Size medium 100 each 5    lidocaine (LMX) 4 % cream Apply topically as needed for mild pain (to both kness) 28 g 1    lidocaine-prilocaine (EMLA) cream Apply topically as needed for mild pain 50 g 2    LORazepam (ATIVAN) 1 mg tablet Take 1 tablet (1 mg total) by mouth every 8 (eight) hours as needed (nausea or anxiety) 20 tablet 0    losartan (COZAAR) 25 mg tablet Take 1 tablet (25 mg total) by mouth daily 30 tablet 5    metFORMIN (GLUCOPHAGE) 500 mg tablet Take 1 tablet (500 mg total) by mouth daily with breakfast 90 tablet 0    metoprolol succinate (TOPROL-XL) 25 mg 24 hr tablet Take 1 tablet (25 mg total) by mouth 2 (two) times a day 180 tablet 2     naloxone (NARCAN) 4 mg/0.1 mL nasal spray Administer 1 spray into a nostril. If no response after 2-3 minutes, give another dose in the other nostril using a new spray. 1 each 1    ondansetron (ZOFRAN) 8 mg tablet Take 1 tablet (8 mg total) by mouth every 8 (eight) hours as needed for nausea or vomiting 30 tablet 1    OneTouch Delica Lancets 33G MISC Check blood sugars three times daily. Please substitute with appropriate alternative as covered by patient's insurance. Dx: E11.65 300 each 5    oxyCODONE (ROXICODONE) 5 immediate release tablet Take 1 tablet (5 mg total) by mouth every 6 (six) hours as needed for severe pain Max Daily Amount: 20 mg 90 tablet 0    phenazopyridine (PYRIDIUM) 100 mg tablet TAKE 1 TABLET (100 MG TOTAL) BY MOUTH 3 (THREE) TIMES A DAY AS NEEDED FOR BLADDER SPASMS 60 tablet 2     No current facility-administered medications on file prior to visit.   [6]   Social History  Tobacco Use    Smoking status: Never    Smokeless tobacco: Never   Vaping Use    Vaping status: Never Used   Substance and Sexual Activity    Alcohol use: Never    Drug use: Never    Sexual activity: Not Currently     Partners: Male     Birth control/protection: Post-menopausal